# Patient Record
Sex: MALE | Race: WHITE | URBAN - METROPOLITAN AREA
[De-identification: names, ages, dates, MRNs, and addresses within clinical notes are randomized per-mention and may not be internally consistent; named-entity substitution may affect disease eponyms.]

---

## 2017-06-12 ENCOUNTER — OFFICE VISIT (OUTPATIENT)
Dept: INTERNAL MEDICINE CLINIC | Age: 47
End: 2017-06-12

## 2017-06-12 VITALS
SYSTOLIC BLOOD PRESSURE: 106 MMHG | BODY MASS INDEX: 35.3 KG/M2 | DIASTOLIC BLOOD PRESSURE: 61 MMHG | HEART RATE: 76 BPM | TEMPERATURE: 98.3 F | WEIGHT: 246.6 LBS | HEIGHT: 70 IN | OXYGEN SATURATION: 96 %

## 2017-06-12 DIAGNOSIS — E78.00 PURE HYPERCHOLESTEROLEMIA: Primary | ICD-10-CM

## 2017-06-12 DIAGNOSIS — R79.89 LFT ELEVATION: ICD-10-CM

## 2017-06-12 DIAGNOSIS — M25.511 CHRONIC RIGHT SHOULDER PAIN: ICD-10-CM

## 2017-06-12 DIAGNOSIS — G89.29 CHRONIC RIGHT SHOULDER PAIN: ICD-10-CM

## 2017-06-12 NOTE — PROGRESS NOTES
HISTORY OF PRESENT ILLNESS  Clementine Morales is a 49 y.o. male. HPI    F/u obesity hld LFt elevation  C/o right shoulder pain x 7-8 months with abduction. Wife is  and pt is performing home exercises--improving  Pain with external rotation of right shoulder  Lost 11 lbs with diet and exercise since last visit and feels much better  Still consume liquor 5 drinks or more once every 1-2 weeks      Patient Active Problem List    Diagnosis Date Noted    HLD (hyperlipidemia) 12/02/2016    LFT elevation 12/02/2016    Morbid obesity due to excess calories ([address] 12/02/2016     Current Outpatient Prescriptions   Medication Sig Dispense Refill    multivitamin (ONE A DAY) tablet Take 1 Tab by mouth daily.  calcium carbonate (TUMS) 200 mg calcium (500 mg) chew Take 1 Tab by mouth daily.  ibuprofen (ADVIL) 200 mg tablet Take  by mouth. No Known Allergies   Lab Results  Component Value Date/Time   Glucose 97 11/29/2016 10:42 AM   LDL, calculated 173 11/29/2016 10:42 AM   Creatinine 0.97 11/29/2016 10:42 AM      Lab Results  Component Value Date/Time   Cholesterol, total 262 11/29/2016 10:42 AM   HDL Cholesterol 43 11/29/2016 10:42 AM   LDL, calculated 173 11/29/2016 10:42 AM   Triglyceride 231 11/29/2016 10:42 AM     Lab Results  Component Value Date/Time   GFR est non-AA 93 11/29/2016 10:42 AM   GFR est  11/29/2016 10:42 AM   Creatinine 0.97 11/29/2016 10:42 AM   BUN 14 11/29/2016 10:42 AM   Sodium 141 11/29/2016 10:42 AM   Potassium 5.5 11/29/2016 10:42 AM   Chloride 98 11/29/2016 10:42 AM   CO2 25 11/29/2016 10:42 AM        ROS    Physical Exam   Constitutional: He appears well-developed and well-nourished. No distress. Appears stated age   HENT:   Head: Normocephalic. Cardiovascular: Normal rate, regular rhythm and normal heart sounds. Exam reveals no gallop and no friction rub. No murmur heard. Pulmonary/Chest: Effort normal and breath sounds normal.   Abdominal: Soft. Musculoskeletal: He exhibits no edema. Slight right anterior shoulder pain to palpation and pain with external rotation   Neurological: He is alert. Psychiatric: He has a normal mood and affect. Nursing note and vitals reviewed. ASSESSMENT and PLAN  Jennifer Jiang was seen today for obesity and cholesterol problem. Diagnoses and all orders for this visit:    Pure hypercholesterolemia  -     METABOLIC PANEL, COMPREHENSIVE  -     LIPID PANEL    Pt declines statin , will continue diet and exercise    LFT elevation  -     METABOLIC PANEL, COMPREHENSIVE   ? Fatty liver or etoh    Consider US if transaminases still elevated  Right shoulder pain   Declines ortho referral   Will continue home exercises  Follow-up Disposition:  Return in about 6 months (around 12/12/2017) for CPE.

## 2017-06-12 NOTE — MR AVS SNAPSHOT
Visit Information Date & Time Provider Department Dept. Phone Encounter #  
 6/12/2017 10:00 AM Aldo Velasquez, [address] [id number] Follow-up Instructions Return in about 6 months (around 12/12/2017) for CPE. Upcoming Health Maintenance Date Due Pneumococcal 19-64 Medium Risk (1 of 1 - PPSV23) 3/9/1988 INFLUENZA AGE 9 TO ADULT 8/1/2017 DTaP/Tdap/Td series (2 - Td) 11/29/2026 Allergies as of 6/12/2017  Review Complete On: 6/12/2017 By: Jack Guan LPN No Known Allergies Current Immunizations  Reviewed on 11/29/2016 Name Date Tdap 11/29/2016 Not reviewed this visit You Were Diagnosed With   
  
 Codes Comments Pure hypercholesterolemia    -  Primary ICD-10-CM: E78.00 ICD-9-CM: 272.0 LFT elevation     ICD-10-CM: R94.5 ICD-9-CM: 790.6 Vitals BP Pulse Temp Height(growth percentile) Weight(growth percentile) SpO2  
 106/61 (BP 1 Location: Left arm, BP Patient Position: Sitting) 76 98.3 °F (36.8 °C) (Oral) 5' 10\" (1.778 m) 246 lb 9.6 oz (111.9 kg) 96% BMI Smoking Status 35.38 kg/m2 Light Tobacco Smoker BMI and BSA Data Body Mass Index Body Surface Area  
 35.38 kg/m 2 2.35 m 2 Preferred Pharmacy Pharmacy Name Phone [facility]il[facility] Rosales [facility] [phone] Your Updated Medication List  
  
   
This list is accurate as of: 6/12/17 10:44 AM.  Always use your most recent med list. ADVIL 200 mg tablet Generic drug:  ibuprofen Take  by mouth.  
  
 calcium carbonate 200 mg calcium (500 mg) Chew Commonly known as:  TUMS Take 1 Tab by mouth daily. multivitamin tablet Commonly known as:  ONE A DAY Take 1 Tab by mouth daily. We Performed the Following LIPID PANEL [52073 CPT(R)] METABOLIC PANEL, COMPREHENSIVE [26152 CPT(R)] Follow-up Instructions Return in about 6 months (around 12/12/2017) for CPE. Introducing [facility] & HEALTH SERVICES! Hilda Gongora introduces [employer] patient portal. Now you can access parts of your medical record, email your doctor's office, and request medication refills online. 1. In your internet browser, go to [email or web address] 2. Click on the First Time User? Click Here link in the Sign In box. You will see the New Member Sign Up page. 3. Enter your [employer]et Access Code exactly as it appears below. You will not need to use this code after youve completed the sign-up process. If you do not sign up before the expiration date, you must request a new code. · Veset Access Code: [email or web address] Expires: 9/10/2017 10:44 AM 
 
4. Enter the last four digits of your Social Security Number (xxxx) and Date of Birth (mm/dd/yyyy) as indicated and click Submit. You will be taken to the next sign-up page. 5. Create a Veset ID. This will be your Veset login ID and cannot be changed, so think of one that is secure and easy to remember. 6. Create a Veset password. You can change your password at any time. 7. Enter your Password Reset Question and Answer. This can be used at a later time if you forget your password. 8. Enter your e-mail address. You will receive e-mail notification when new information is available in [address] 9. Click Sign Up. You can now view and download portions of your medical record. 10. Click the Download Summary menu link to download a portable copy of your medical information. If you have questions, please visit the Frequently Asked Questions section of the Veset website. Remember, [employer]et is NOT to be used for urgent needs. For medical emergencies, dial 911. Now available from your iPhone and Android! Please provide this summary of care documentation to your next provider. If you have any questions after today's visit, please call [phone].

## 2017-06-22 LAB
ALBUMIN SERPL-MCNC: 4.4 G/DL (ref 3.5–5.5)
ALBUMIN/GLOB SERPL: 2.1 {RATIO} (ref 1.2–2.2)
ALP SERPL-CCNC: 64 IU/L (ref 39–117)
ALT SERPL-CCNC: 47 IU/L (ref 0–44)
AST SERPL-CCNC: 54 IU/L (ref 0–40)
BILIRUB SERPL-MCNC: 0.7 MG/DL (ref 0–1.2)
BUN SERPL-MCNC: 15 MG/DL (ref 6–24)
BUN/CREAT SERPL: 15 (ref 9–20)
CALCIUM SERPL-MCNC: 9.3 MG/DL (ref 8.7–10.2)
CHLORIDE SERPL-SCNC: 103 MMOL/L (ref 96–106)
CHOLEST SERPL-MCNC: 199 MG/DL (ref 100–199)
CO2 SERPL-SCNC: 21 MMOL/L (ref 18–29)
CREAT SERPL-MCNC: 0.98 MG/DL (ref 0.76–1.27)
GLOBULIN SER CALC-MCNC: 2.1 G/DL (ref 1.5–4.5)
GLUCOSE SERPL-MCNC: 96 MG/DL (ref 65–99)
HDLC SERPL-MCNC: 39 MG/DL
LDLC SERPL CALC-MCNC: 136 MG/DL (ref 0–99)
POTASSIUM SERPL-SCNC: 4.5 MMOL/L (ref 3.5–5.2)
PROT SERPL-MCNC: 6.5 G/DL (ref 6–8.5)
SODIUM SERPL-SCNC: 141 MMOL/L (ref 134–144)
TRIGL SERPL-MCNC: 119 MG/DL (ref 0–149)
VLDLC SERPL CALC-MCNC: 24 MG/DL (ref 5–40)

## 2017-12-21 ENCOUNTER — OFFICE VISIT (OUTPATIENT)
Dept: INTERNAL MEDICINE CLINIC | Age: 47
End: 2017-12-21

## 2017-12-21 VITALS
WEIGHT: 262 LBS | HEART RATE: 68 BPM | SYSTOLIC BLOOD PRESSURE: 110 MMHG | HEIGHT: 70 IN | OXYGEN SATURATION: 96 % | BODY MASS INDEX: 37.51 KG/M2 | DIASTOLIC BLOOD PRESSURE: 68 MMHG | TEMPERATURE: 97.8 F

## 2017-12-21 DIAGNOSIS — Z00.00 ROUTINE GENERAL MEDICAL EXAMINATION AT A HEALTH CARE FACILITY: Primary | ICD-10-CM

## 2017-12-21 DIAGNOSIS — E78.00 PURE HYPERCHOLESTEROLEMIA: ICD-10-CM

## 2017-12-21 DIAGNOSIS — R79.89 LFT ELEVATION: ICD-10-CM

## 2017-12-21 DIAGNOSIS — E66.9 CLASS 2 OBESITY WITHOUT SERIOUS COMORBIDITY WITH BODY MASS INDEX (BMI) OF 35.0 TO 35.9 IN ADULT, UNSPECIFIED OBESITY TYPE: ICD-10-CM

## 2017-12-21 NOTE — MR AVS SNAPSHOT
Visit Information Date & Time Provider Department Dept. Phone Encounter #  
 12/21/2017 10:30 AM Yovana Palma, [address] [place] [address],[address] [phone] [id number] Follow-up Instructions Return in about 6 months (around 6/21/2018) for weight hld. Upcoming Health Maintenance Date Due DTaP/Tdap/Td series (2 - Td) 11/29/2026 Allergies as of 12/21/2017  Review Complete On: 12/21/2017 By: Mirella Houser LPN No Known Allergies Current Immunizations  Reviewed on 11/29/2016 Name Date Tdap 11/29/2016 Not reviewed this visit You Were Diagnosed With   
  
 Codes Comments Routine general medical examination at a health care facility    -  Primary ICD-10-CM: Z00.00 ICD-9-CM: V70.0 Pure hypercholesterolemia     ICD-10-CM: E78.00 ICD-9-CM: 272.0 LFT elevation     ICD-10-CM: R79.89 ICD-9-CM: 790.6 Class 2 obesity without serious comorbidity with body mass index (BMI) of 35.0 to 35.9 in adult, unspecified obesity type     ICD-10-CM: E66.9, Z68.35 ICD-9-CM: 278.00, V85.35 Vitals BP Pulse Temp Height(growth percentile) Weight(growth percentile) SpO2  
 110/68 (BP 1 Location: Left arm, BP Patient Position: Sitting) 68 97.8 °F (36.6 °C) (Oral) 5' 10\" (1.778 m) 262 lb (118.8 kg) 96% BMI Smoking Status 37.59 kg/m2 Light Tobacco Smoker BMI and BSA Data Body Mass Index Body Surface Area  
 37.59 kg/m 2 2.42 m 2 Preferred Pharmacy Pharmacy Name Phone Charley Martines 70 [phone] Your Updated Medication List  
  
   
This list is accurate as of: 12/21/17 11:04 AM.  Always use your most recent med list. ADVIL 200 mg tablet Generic drug:  ibuprofen Take  by mouth.  
  
 calcium carbonate 200 mg calcium (500 mg) Chew Commonly known as:  TUMS Take 1 Tab by mouth daily. multivitamin tablet Commonly known as:  ONE A DAY  
 Take 1 Tab by mouth daily. We Performed the Following CBC W/O DIFF [59909 CPT(R)] GGT [95209 CPT(R)] LIPID PANEL [02115 CPT(R)] METABOLIC PANEL, COMPREHENSIVE [51216 CPT(R)] TSH 3RD GENERATION [39458 CPT(R)] Follow-up Instructions Return in about 6 months (around 6/21/2018) for weight hld. To-Do List   
 12/29/2017 Imaging:  US ABD COMP Introducing [facility] & HEALTH SERVICES! [employer] introduces [employer] patient portal. Now you can access parts of your medical record, email your doctor's office, and request medication refills online. 1. In your internet browser, go to [email or web address] 2. Click on the First Time User? Click Here link in the Sign In box. You will see the New Member Sign Up page. 3. Enter your [employer] Access Code exactly as it appears below. You will not need to use this code after youve completed the sign-up process. If you do not sign up before the expiration date, you must request a new code. · [employer] Access Code: [email or web address] Expires: 3/21/2018 11:04 AM 
 
4. Enter the last four digits of your Social Security Number (xxxx) and Date of Birth (mm/dd/yyyy) as indicated and click Submit. You will be taken to the next sign-up page. 5. Create a [employer] ID. This will be your [employer] login ID and cannot be changed, so think of one that is secure and easy to remember. 6. Create a [employer] password. You can change your password at any time. 7. Enter your Password Reset Question and Answer. This can be used at a later time if you forget your password. 8. Enter your e-mail address. You will receive e-mail notification when new information is available in [address] 9. Click Sign Up. You can now view and download portions of your medical record. 10. Click the Download Summary menu link to download a portable copy of your medical information. If you have questions, please visit the Frequently Asked Questions section of the [employer] website. Remember, [employer] is NOT to be used for urgent needs. For medical emergencies, dial 911. Now available from your iPhone and Android! Please provide this summary of care documentation to your next provider. If you have any questions after today's visit, please call [phone].

## 2017-12-21 NOTE — PROGRESS NOTES
HISTORY OF PRESENT ILLNESS  Chucky Mckoy is a 49 y.o. male. HPI   Here for CPE F/u obesity hld LFt elevation  Weight back up --has been away from gym x 7 weeks  Not on strict diet  C/o right shoulder pain x 7-8 months with abduction. Wife is  and pt is performing home exercises--improving  Pain with external rotation of right shoulder  Still consume liquor 5 drinks or more once every 1-2 weeks  Neg hep b, c, jay and smooth muscle ab  Has not had US liver    Patient Active Problem List    Diagnosis Date Noted    HLD (hyperlipidemia) 12/02/2016    LFT elevation 12/02/2016    Morbid obesity due to excess calories ([address] 12/02/2016     Current Outpatient Prescriptions   Medication Sig Dispense Refill    multivitamin (ONE A DAY) tablet Take 1 Tab by mouth daily.  calcium carbonate (TUMS) 200 mg calcium (500 mg) chew Take 1 Tab by mouth daily.  ibuprofen (ADVIL) 200 mg tablet Take  by mouth. No Known Allergies   Lab Results  Component Value Date/Time   WBC 6.5 11/29/2016 10:42 AM   HGB 16.9 11/29/2016 10:42 AM   HCT 48.5 11/29/2016 10:42 AM   PLATELET 672 45/97/2100 10:42 AM   MCV 86 11/29/2016 10:42 AM     Lab Results  Component Value Date/Time   Glucose 96 06/21/2017 08:37 AM   LDL, calculated 136 06/21/2017 08:37 AM   Creatinine 0.98 06/21/2017 08:37 AM      Lab Results  Component Value Date/Time   Cholesterol, total 199 06/21/2017 08:37 AM   HDL Cholesterol 39 06/21/2017 08:37 AM   LDL, calculated 136 06/21/2017 08:37 AM   Triglyceride 119 06/21/2017 08:37 AM     Lab Results  Component Value Date/Time   ALT (SGPT) 47 06/21/2017 08:37 AM   AST (SGOT) 54 06/21/2017 08:37 AM   Alk. phosphatase 64 06/21/2017 08:37 AM   Bilirubin, total 0.7 06/21/2017 08:37 AM   Albumin 4.4 06/21/2017 08:37 AM   Protein, total 6.5 06/21/2017 08:37 AM   PLATELET 649 74/68/5300 10:42 AM          ROS    Physical Exam   Constitutional: He appears well-developed and well-nourished. No distress. Appears stated age, obese   HENT:   Head: Normocephalic. Mouth/Throat: Oropharynx is clear and moist.   Eyes: Pupils are equal, round, and reactive to light. Neck: Normal range of motion. Neck supple. No JVD present. No tracheal deviation present. No thyromegaly present. Cardiovascular: Normal rate, regular rhythm and normal heart sounds. Exam reveals no gallop and no friction rub. No murmur heard. Pulmonary/Chest: Effort normal and breath sounds normal. No respiratory distress. He has no wheezes. He has no rales. He exhibits no tenderness. Abdominal: Soft. He exhibits no distension and no mass. There is no tenderness. There is no rebound and no guarding. Musculoskeletal: He exhibits no edema or tenderness. Lymphadenopathy:     He has no cervical adenopathy. Neurological: He is alert. Skin: Skin is warm. Psychiatric: He has a normal mood and affect. Nursing note and vitals reviewed. ASSESSMENT and PLAN  Diagnoses and all orders for this visit:    1. Routine general medical examination at a health care facility  -     CBC W/O DIFF  -     METABOLIC PANEL, COMPREHENSIVE  -     LIPID PANEL  -     TSH 3RD GENERATION   Declines flu shot   Needs weight reduction with diet and exercise    2. Pure hypercholesterolemia   Not exercising, weight up   3. LFT elevation  -     GGT  -     US ABD COMP; Future    Limit alcohol consumption to < 2 drinks per pt    4. Class 2 obesity without serious comorbidity with body mass index (BMI) of 35.0 to 35.9 in adult, unspecified obesity type      Follow-up Disposition:  Return in about 6 months (around 6/21/2018) for weight hld.

## 2017-12-22 LAB
ALBUMIN SERPL-MCNC: 4.6 G/DL (ref 3.5–5.5)
ALBUMIN/GLOB SERPL: 2 {RATIO} (ref 1.2–2.2)
ALP SERPL-CCNC: 66 IU/L (ref 39–117)
ALT SERPL-CCNC: 39 IU/L (ref 0–44)
AST SERPL-CCNC: 38 IU/L (ref 0–40)
BILIRUB SERPL-MCNC: 0.7 MG/DL (ref 0–1.2)
BUN SERPL-MCNC: 14 MG/DL (ref 6–24)
BUN/CREAT SERPL: 15 (ref 9–20)
CALCIUM SERPL-MCNC: 9.7 MG/DL (ref 8.7–10.2)
CHLORIDE SERPL-SCNC: 101 MMOL/L (ref 96–106)
CHOLEST SERPL-MCNC: 230 MG/DL (ref 100–199)
CO2 SERPL-SCNC: 24 MMOL/L (ref 18–29)
CREAT SERPL-MCNC: 0.96 MG/DL (ref 0.76–1.27)
ERYTHROCYTE [DISTWIDTH] IN BLOOD BY AUTOMATED COUNT: 14 % (ref 12.3–15.4)
GGT SERPL-CCNC: 24 IU/L (ref 0–65)
GLOBULIN SER CALC-MCNC: 2.3 G/DL (ref 1.5–4.5)
GLUCOSE SERPL-MCNC: 91 MG/DL (ref 65–99)
HCT VFR BLD AUTO: 48.4 % (ref 37.5–51)
HDLC SERPL-MCNC: 44 MG/DL
HGB BLD-MCNC: 17.2 G/DL (ref 13–17.7)
LDLC SERPL CALC-MCNC: 145 MG/DL (ref 0–99)
MCH RBC QN AUTO: 30.6 PG (ref 26.6–33)
MCHC RBC AUTO-ENTMCNC: 35.5 G/DL (ref 31.5–35.7)
MCV RBC AUTO: 86 FL (ref 79–97)
PLATELET # BLD AUTO: 272 X10E3/UL (ref 150–379)
POTASSIUM SERPL-SCNC: 4.6 MMOL/L (ref 3.5–5.2)
PROT SERPL-MCNC: 6.9 G/DL (ref 6–8.5)
RBC # BLD AUTO: 5.63 X10E6/UL (ref 4.14–5.8)
SODIUM SERPL-SCNC: 141 MMOL/L (ref 134–144)
TRIGL SERPL-MCNC: 205 MG/DL (ref 0–149)
TSH SERPL DL<=0.005 MIU/L-ACNC: 1.66 UIU/ML (ref 0.45–4.5)
VLDLC SERPL CALC-MCNC: 41 MG/DL (ref 5–40)
WBC # BLD AUTO: 5.5 X10E3/UL (ref 3.4–10.8)

## 2018-06-10 NOTE — PROGRESS NOTES
HISTORY OF PRESENT ILLNESS  Yuko Rutherford is a 51 y.o. male. HPI       F/u obesity hld LFt elevation  Last LFt wnl  US ordered but not done  etoh--  Has been sedentary, weight unchanged  Last LDL--145      Last OV:  Here for CPEWeight back up --has been away from gym x 7 weeks  Not on strict diet  C/o right shoulder pain x 7-8 months with abduction. Wife is  and pt is performing home exercises--improving  Pain with external rotation of right shoulder  Still consume liquor 5 drinks or more once every 1-2 weeks  Neg hep b, c, jay and smooth muscle ab  Has not had US liver       Patient Active Problem List    Diagnosis Date Noted    HLD (hyperlipidemia) 12/02/2016    LFT elevation 12/02/2016    Morbid obesity due to excess calories ([address] 12/02/2016     Current Outpatient Prescriptions   Medication Sig Dispense Refill    multivitamin (ONE A DAY) tablet Take 1 Tab by mouth daily.  calcium carbonate (TUMS) 200 mg calcium (500 mg) chew Take 1 Tab by mouth daily.  ibuprofen (ADVIL) 200 mg tablet Take  by mouth. No Known Allergies   Lab Results  Component Value Date/Time   Glucose 91 12/21/2017 11:17 AM   LDL, calculated 145 (H) 12/21/2017 11:17 AM   Creatinine 0.96 12/21/2017 11:17 AM      Lab Results  Component Value Date/Time   Cholesterol, total 230 (H) 12/21/2017 11:17 AM   HDL Cholesterol 44 12/21/2017 11:17 AM   LDL, calculated 145 (H) 12/21/2017 11:17 AM   Triglyceride 205 (H) 12/21/2017 11:17 AM     Lab Results  Component Value Date/Time   GFR est non-AA 93 12/21/2017 11:17 AM   GFR est  12/21/2017 11:17 AM   Creatinine 0.96 12/21/2017 11:17 AM   BUN 14 12/21/2017 11:17 AM   Sodium 141 12/21/2017 11:17 AM   Potassium 4.6 12/21/2017 11:17 AM   Chloride 101 12/21/2017 11:17 AM   CO2 24 12/21/2017 11:17 AM        ROS    Physical Exam   Constitutional: He appears well-developed and well-nourished. No distress. Appears stated age, obese, nad   HENT:   Head: Normocephalic. Cardiovascular: Normal rate, regular rhythm and normal heart sounds. Exam reveals no gallop and no friction rub. No murmur heard. Pulmonary/Chest: Effort normal and breath sounds normal. No respiratory distress. He has no wheezes. He has no rales. He exhibits no tenderness. Abdominal: Soft. Musculoskeletal: He exhibits no edema. Neurological: He is alert. Psychiatric: He has a normal mood and affect. Nursing note and vitals reviewed. ASSESSMENT and PLAN  Diagnoses and all orders for this visit:    1. Morbid obesity due to excess calories ([address]   I have reviewed/discussed the above normal BMI with the patient. I have recommended the following interventions: dietary management education, guidance, and counseling and encourage exercise . Iliana Herrera 2. Pure hypercholesterolemia  -     LIPID PANEL  -     METABOLIC PANEL, COMPREHENSIVE   Pt wants to avoid cholesterol lowering medication  3. LFT elevation  -     METABOLIC PANEL, COMPREHENSIVE   Advised weight reduction, limit etoh   Consider US abdomen if LFT up--discussed    Follow-up Disposition:  Return in about 6 months (around 12/11/2018) for chol LFT weight.

## 2018-06-11 ENCOUNTER — OFFICE VISIT (OUTPATIENT)
Dept: INTERNAL MEDICINE CLINIC | Age: 48
End: 2018-06-11

## 2018-06-11 VITALS
OXYGEN SATURATION: 95 % | TEMPERATURE: 97.8 F | WEIGHT: 262 LBS | HEART RATE: 62 BPM | SYSTOLIC BLOOD PRESSURE: 125 MMHG | DIASTOLIC BLOOD PRESSURE: 78 MMHG | BODY MASS INDEX: 37.51 KG/M2 | HEIGHT: 70 IN

## 2018-06-11 DIAGNOSIS — E78.00 PURE HYPERCHOLESTEROLEMIA: ICD-10-CM

## 2018-06-11 DIAGNOSIS — R79.89 LFT ELEVATION: ICD-10-CM

## 2018-06-11 DIAGNOSIS — E66.01 MORBID OBESITY DUE TO EXCESS CALORIES (HCC): Primary | ICD-10-CM

## 2018-06-11 NOTE — MR AVS SNAPSHOT
[address] 
[phone] Patient: Jennifer Marvin MRN: [id number] Visit Information Date & Time Provider Department Dept. Phone Encounter #  
 2018  8:45 AM Geraldine Roca, [address] [place] [address],[address] [phone] [id number] Follow-up Instructions Return in about 6 months (around 2018) for chol LFT weight. Upcoming Health Maintenance Date Due Influenza Age 5 to Adult 2018 DTaP/Tdap/Td series (2 - Td) 2026 Allergies as of 2018  Review Complete On: 2018 By: Gigi Tai LPN No Known Allergies Current Immunizations  Reviewed on 2016 Name Date Tdap 2016 Not reviewed this visit You Were Diagnosed With   
  
 Codes Comments Morbid obesity due to excess calories ([facility]    -  Primary ICD-10-CM: E66.01 
ICD-9-CM: 278.01   
 Pure hypercholesterolemia     ICD-10-CM: E78.00 ICD-9-CM: 272.0 LFT elevation     ICD-10-CM: R79.89 ICD-9-CM: 790.6 Vitals BP Pulse Temp Height(growth percentile) Weight(growth percentile) SpO2  
 125/78 (BP 1 Location: Left arm, BP Patient Position: Sitting) 62 97.8 °F (36.6 °C) (Oral) 5' 10\" (1.778 m) 262 lb (118.8 kg) 95% BMI Smoking Status 37.59 kg/m2 Light Tobacco Smoker BMI and BSA Data Body Mass Index Body Surface Area  
 37.59 kg/m 2 2.42 m 2 Preferred Pharmacy Pharmacy Name Phone Kate [facility]mart [address] , [facility] 70 [phone] Your Updated Medication List  
  
   
This list is accurate as of 18  9:22 AM.  Always use your most recent med list. ADVIL 200 mg tablet Generic drug:  ibuprofen Take  by mouth.  
  
 calcium carbonate 200 mg calcium (500 mg) Chew Commonly known as:  TUMS Take 1 Tab by mouth daily. multivitamin tablet Commonly known as:  ONE A DAY Take 1 Tab by mouth daily. We Performed the Following LIPID PANEL [04927 CPT(R)] METABOLIC PANEL, COMPREHENSIVE [99832 CPT(R)] Follow-up Instructions Return in about 6 months (around 12/11/2018) for chol LFT weight. Introducing [facility] & HEALTH SERVICES! Darien Qureshi introduces [employer] patient portal. Now you can access parts of your medical record, email your doctor's office, and request medication refills online. 1. In your internet browser, go to [email or web address] 2. Click on the First Time User? Click Here link in the Sign In box. You will see the New Member Sign Up page. 3. Enter your [employer] Access Code exactly as it appears below. You will not need to use this code after youve completed the sign-up process. If you do not sign up before the expiration date, you must request a new code. · [employer] Access Code: [email or web address] Expires: 9/9/2018  9:22 AM 
 
4. Enter the last four digits of your Social Security Number (xxxx) and Date of Birth (mm/dd/yyyy) as indicated and click Submit. You will be taken to the next sign-up page. 5. Create a [employer] ID. This will be your [employer] login ID and cannot be changed, so think of one that is secure and easy to remember. 6. Create a [employer] password. You can change your password at any time. 7. Enter your Password Reset Question and Answer. This can be used at a later time if you forget your password. 8. Enter your e-mail address. You will receive e-mail notification when new information is available in [address] 9. Click Sign Up. You can now view and download portions of your medical record. 10. Click the Download Summary menu link to download a portable copy of your medical information. If you have questions, please visit the Frequently Asked Questions section of the [employer] website.  Remember, [employer] is NOT to be used for urgent needs. For medical emergencies, dial 911. Now available from your iPhone and Android! Please provide this summary of care documentation to your next provider. If you have any questions after today's visit, please call [phone].

## 2018-06-12 LAB
ALBUMIN SERPL-MCNC: 4.8 G/DL (ref 3.5–5.5)
ALBUMIN/GLOB SERPL: 2.5 {RATIO} (ref 1.2–2.2)
ALP SERPL-CCNC: 62 IU/L (ref 39–117)
ALT SERPL-CCNC: 46 IU/L (ref 0–44)
AST SERPL-CCNC: 44 IU/L (ref 0–40)
BILIRUB SERPL-MCNC: 0.7 MG/DL (ref 0–1.2)
BUN SERPL-MCNC: 12 MG/DL (ref 6–24)
BUN/CREAT SERPL: 14 (ref 9–20)
CALCIUM SERPL-MCNC: 9.5 MG/DL (ref 8.7–10.2)
CHLORIDE SERPL-SCNC: 104 MMOL/L (ref 96–106)
CHOLEST SERPL-MCNC: 238 MG/DL (ref 100–199)
CO2 SERPL-SCNC: 20 MMOL/L (ref 20–29)
CREAT SERPL-MCNC: 0.85 MG/DL (ref 0.76–1.27)
GFR SERPLBLD CREATININE-BSD FMLA CKD-EPI: 102 ML/MIN/1.73
GFR SERPLBLD CREATININE-BSD FMLA CKD-EPI: 118 ML/MIN/1.73
GLOBULIN SER CALC-MCNC: 1.9 G/DL (ref 1.5–4.5)
GLUCOSE SERPL-MCNC: 98 MG/DL (ref 65–99)
HDLC SERPL-MCNC: 44 MG/DL
LDLC SERPL CALC-MCNC: 160 MG/DL (ref 0–99)
POTASSIUM SERPL-SCNC: 4.7 MMOL/L (ref 3.5–5.2)
PROT SERPL-MCNC: 6.7 G/DL (ref 6–8.5)
SODIUM SERPL-SCNC: 142 MMOL/L (ref 134–144)
TRIGL SERPL-MCNC: 170 MG/DL (ref 0–149)
VLDLC SERPL CALC-MCNC: 34 MG/DL (ref 5–40)

## 2018-12-20 ENCOUNTER — OFFICE VISIT (OUTPATIENT)
Dept: INTERNAL MEDICINE CLINIC | Age: 48
End: 2018-12-20

## 2018-12-20 VITALS
HEART RATE: 69 BPM | TEMPERATURE: 97.4 F | WEIGHT: 260 LBS | BODY MASS INDEX: 37.22 KG/M2 | HEIGHT: 70 IN | OXYGEN SATURATION: 95 % | DIASTOLIC BLOOD PRESSURE: 75 MMHG | SYSTOLIC BLOOD PRESSURE: 124 MMHG

## 2018-12-20 DIAGNOSIS — E66.01 MORBID OBESITY (HCC): ICD-10-CM

## 2018-12-20 DIAGNOSIS — E78.00 PURE HYPERCHOLESTEROLEMIA: ICD-10-CM

## 2018-12-20 DIAGNOSIS — R79.89 LFT ELEVATION: Primary | ICD-10-CM

## 2018-12-20 NOTE — PROGRESS NOTES
Chief Complaint   Patient presents with    Cholesterol Problem     6 month follow up    Labs     6 month follow up

## 2018-12-20 NOTE — PROGRESS NOTES
HISTORY OF PRESENT ILLNESS  Anna Jose is a 51 y.o. male. HPI         F/u obesity hld LFT elevation  Last   etoh-4-6 /week              GGT wnl  US not done  Has not been exercising regularly but does some walking and running with -165  Feels well with exercise    last OV  Last LFt wnl  US ordered but not done  etoh--  Has been sedentary, weight unchanged  Last LDL--145        Last OV:  Here for CPEWeight back up --has been away from gym x 7 weeks  Not on strict diet  C/o right shoulder pain x 7-8 months with abduction. Wife is  and pt is performing home exercises--improving  Pain with external rotation of right shoulder  Still consume liquor 5 drinks or more once every 1-2 weeks  Neg hep b, c, jay and smooth muscle ab  Has not had US liver        Patient Active Problem List    Diagnosis Date Noted    HLD (hyperlipidemia) 12/02/2016    LFT elevation 12/02/2016    Morbid obesity due to excess calories ([address] 12/02/2016     Current Outpatient Medications   Medication Sig Dispense Refill    multivitamin (ONE A DAY) tablet Take 1 Tab by mouth daily.  calcium carbonate (TUMS) 200 mg calcium (500 mg) chew Take 1 Tab by mouth daily.  ibuprofen (ADVIL) 200 mg tablet Take  by mouth.        No Known Allergies   Lab Results   Component Value Date/Time    WBC 5.5 12/21/2017 11:17 AM    HGB 17.2 12/21/2017 11:17 AM    HCT 48.4 12/21/2017 11:17 AM    PLATELET 587 57/90/5256 11:17 AM    MCV 86 12/21/2017 11:17 AM     Lab Results   Component Value Date/Time    Glucose 98 06/11/2018 09:46 AM    LDL, calculated 160 (H) 06/11/2018 09:46 AM    Creatinine 0.85 06/11/2018 09:46 AM      Lab Results   Component Value Date/Time    Cholesterol, total 238 (H) 06/11/2018 09:46 AM    HDL Cholesterol 44 06/11/2018 09:46 AM    LDL, calculated 160 (H) 06/11/2018 09:46 AM    Triglyceride 170 (H) 06/11/2018 09:46 AM     Lab Results   Component Value Date/Time    GFR est non- 06/11/2018 09:46 AM GFR est  06/11/2018 09:46 AM    Creatinine 0.85 06/11/2018 09:46 AM    BUN 12 06/11/2018 09:46 AM    Sodium 142 06/11/2018 09:46 AM    Potassium 4.7 06/11/2018 09:46 AM    Chloride 104 06/11/2018 09:46 AM    CO2 20 06/11/2018 09:46 AM     No results found for: PSA, Sherine Caruso, PSAR3, [id number], [id number], PSALT     ROS    Physical Exam   Constitutional: He appears well-developed and well-nourished. No distress. Appears stated age, obese , nad   HENT:   Head: Normocephalic. Cardiovascular: Normal rate, regular rhythm and normal heart sounds. Exam reveals no gallop and no friction rub. No murmur heard. Pulmonary/Chest: Effort normal and breath sounds normal.   Abdominal: Soft. Musculoskeletal: He exhibits no edema. Neurological: He is alert. Psychiatric: He has a normal mood and affect. Nursing note and vitals reviewed. ASSESSMENT and PLAN  Diagnoses and all orders for this visit:    1. LFT elevation  -     METABOLIC PANEL, COMPREHENSIVE  -     REFERRAL TO GASTROENTEROLOGY   advised weight reduction-suspect fatty liver  2. Pure hypercholesterolemia  -     METABOLIC PANEL, COMPREHENSIVE  -     LIPID PANEL   Managing with diet and exercise  3. Morbid obesity ([address]   I have reviewed/discussed the above normal BMI with the patient. I have recommended the following interventions: dietary management education, guidance, and counseling and encourage exercise . [facility] Follow-up Disposition:  Return in about 6 months (around 6/20/2019) for lft colon screen weight.

## 2018-12-21 LAB
ALBUMIN SERPL-MCNC: 4.6 G/DL (ref 3.5–5.5)
ALBUMIN/GLOB SERPL: 2 {RATIO} (ref 1.2–2.2)
ALP SERPL-CCNC: 70 IU/L (ref 39–117)
ALT SERPL-CCNC: 50 IU/L (ref 0–44)
AST SERPL-CCNC: 43 IU/L (ref 0–40)
BILIRUB SERPL-MCNC: 0.6 MG/DL (ref 0–1.2)
BUN SERPL-MCNC: 15 MG/DL (ref 6–24)
BUN/CREAT SERPL: 14 (ref 9–20)
CALCIUM SERPL-MCNC: 9.6 MG/DL (ref 8.7–10.2)
CHLORIDE SERPL-SCNC: 103 MMOL/L (ref 96–106)
CHOLEST SERPL-MCNC: 229 MG/DL (ref 100–199)
CO2 SERPL-SCNC: 23 MMOL/L (ref 20–29)
CREAT SERPL-MCNC: 1.04 MG/DL (ref 0.76–1.27)
GLOBULIN SER CALC-MCNC: 2.3 G/DL (ref 1.5–4.5)
GLUCOSE SERPL-MCNC: 88 MG/DL (ref 65–99)
HDLC SERPL-MCNC: 39 MG/DL
LDLC SERPL CALC-MCNC: 158 MG/DL (ref 0–99)
POTASSIUM SERPL-SCNC: 5.2 MMOL/L (ref 3.5–5.2)
PROT SERPL-MCNC: 6.9 G/DL (ref 6–8.5)
SODIUM SERPL-SCNC: 142 MMOL/L (ref 134–144)
TRIGL SERPL-MCNC: 162 MG/DL (ref 0–149)
VLDLC SERPL CALC-MCNC: 32 MG/DL (ref 5–40)

## 2019-12-07 NOTE — PROGRESS NOTES
HISTORY OF PRESENT ILLNESS  Sara Banuelos is a 47 y.o. male. 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     Last OV  Last   etoh-4-6 /week              GGT wnl  US not done  Has not been exercising regularly but does some walking and running with -165  Feels well with exercise     last OV  Last LFt wnl  US ordered but not done  etoh--  Has been sedentary, weight unchanged  Last LDL--145          Patient Active Problem List    Diagnosis Date Noted    HLD (hyperlipidemia) 12/02/2016    LFT elevation 12/02/2016    Morbid obesity due to excess calories ([address] 12/02/2016     Current Outpatient Medications   Medication Sig Dispense Refill    multivitamin (ONE A DAY) tablet Take 1 Tab by mouth daily.  calcium carbonate (TUMS) 200 mg calcium (500 mg) chew Take 1 Tab by mouth daily.  ibuprofen (ADVIL) 200 mg tablet Take  by mouth. No Known Allergies  Social History     Tobacco Use    Smoking status: Light Tobacco Smoker     Types: Cigars     Start date: 1/1/2000    Smokeless tobacco: Never Used   Substance Use Topics    Alcohol use:  Yes     Alcohol/week: 6.0 - 8.0 standard drinks     Types: 6 - 8 Shots of liquor per week     Frequency: 2-3 times a week     Drinks per session: 1 or 2      Lab Results   Component Value Date/Time    WBC 5.5 12/21/2017 11:17 AM    HGB 17.2 12/21/2017 11:17 AM    HCT 48.4 12/21/2017 11:17 AM    PLATELET 159 73/59/9431 11:17 AM    MCV 86 12/21/2017 11:17 AM     Lab Results   Component Value Date/Time    Glucose 88 12/20/2018 08:46 AM    LDL, calculated 158 (H) 12/20/2018 08:46 AM    Creatinine 1.04 12/20/2018 08:46 AM      Lab Results   Component Value Date/Time    Cholesterol, total 229 (H) 12/20/2018 08:46 AM    HDL Cholesterol 39 (L) 12/20/2018 08:46 AM LDL, calculated 158 (H) 12/20/2018 08:46 AM    Triglyceride 162 (H) 12/20/2018 08:46 AM     Lab Results   Component Value Date/Time    GFR est non-AA 84 12/20/2018 08:46 AM    GFR est AA 97 12/20/2018 08:46 AM    Creatinine 1.04 12/20/2018 08:46 AM    BUN 15 12/20/2018 08:46 AM    Sodium 142 12/20/2018 08:46 AM    Potassium 5.2 12/20/2018 08:46 AM    Chloride 103 12/20/2018 08:46 AM    CO2 23 12/20/2018 08:46 AM        ROS    Physical Exam  Vitals signs and nursing note reviewed. Constitutional:       General: He is not in acute distress. Appearance: He is well-developed. He is obese. Comments: Appears stated age   HENT:      Head: Normocephalic. Cardiovascular:      Rate and Rhythm: Normal rate and regular rhythm. Heart sounds: Normal heart sounds. Pulmonary:      Effort: Pulmonary effort is normal.      Breath sounds: Normal breath sounds. Abdominal:      Palpations: Abdomen is soft. Neurological:      Mental Status: He is alert. ASSESSMENT and PLAN  Diagnoses and all orders for this visit:    1. LFT elevation  -     REFERRAL TO GASTROENTEROLOGY  -     METABOLIC PANEL, COMPREHENSIVE   Pt declines US and prefers to monitor labs with weight control-possible fatty liver     2. Colon cancer screening  -     REFERRAL TO GASTROENTEROLOGY    3. Prostate cancer screening  -     PSA W/ REFLX FREE PSA    4. Pure hypercholesterolemia  -     CBC W/O DIFF  -     METABOLIC PANEL, COMPREHENSIVE  -     LIPID PANEL  -     TSH 3RD GENERATION   Managing with diet and exercise    5. Obesity (BMI 30-39. 9)   Pt will continue low carb diet and exercizsxe    Follow-up and Dispositions    · Return in about 1 year (around 12/9/2020) for CPE.

## 2019-12-09 ENCOUNTER — OFFICE VISIT (OUTPATIENT)
Dept: INTERNAL MEDICINE CLINIC | Age: 49
End: 2019-12-09

## 2019-12-09 VITALS
HEIGHT: 70 IN | TEMPERATURE: 97.4 F | OXYGEN SATURATION: 96 % | BODY MASS INDEX: 35.36 KG/M2 | WEIGHT: 247 LBS | RESPIRATION RATE: 16 BRPM | HEART RATE: 65 BPM | DIASTOLIC BLOOD PRESSURE: 72 MMHG | SYSTOLIC BLOOD PRESSURE: 114 MMHG

## 2019-12-09 DIAGNOSIS — Z12.5 PROSTATE CANCER SCREENING: ICD-10-CM

## 2019-12-09 DIAGNOSIS — R79.89 LFT ELEVATION: Primary | ICD-10-CM

## 2019-12-09 DIAGNOSIS — E78.00 PURE HYPERCHOLESTEROLEMIA: ICD-10-CM

## 2019-12-09 DIAGNOSIS — Z12.11 COLON CANCER SCREENING: ICD-10-CM

## 2019-12-09 DIAGNOSIS — E66.9 OBESITY (BMI 30-39.9): ICD-10-CM

## 2019-12-10 LAB
ALBUMIN SERPL-MCNC: 4.6 G/DL (ref 3.5–5.5)
ALBUMIN/GLOB SERPL: 2.2 {RATIO} (ref 1.2–2.2)
ALP SERPL-CCNC: 69 IU/L (ref 39–117)
ALT SERPL-CCNC: 49 IU/L (ref 0–44)
AST SERPL-CCNC: 48 IU/L (ref 0–40)
BILIRUB SERPL-MCNC: 0.4 MG/DL (ref 0–1.2)
BUN SERPL-MCNC: 17 MG/DL (ref 6–24)
BUN/CREAT SERPL: 18 (ref 9–20)
CALCIUM SERPL-MCNC: 9.4 MG/DL (ref 8.7–10.2)
CHLORIDE SERPL-SCNC: 105 MMOL/L (ref 96–106)
CHOLEST SERPL-MCNC: 220 MG/DL (ref 100–199)
CO2 SERPL-SCNC: 21 MMOL/L (ref 20–29)
CREAT SERPL-MCNC: 0.94 MG/DL (ref 0.76–1.27)
ERYTHROCYTE [DISTWIDTH] IN BLOOD BY AUTOMATED COUNT: 13.6 % (ref 12.3–15.4)
GLOBULIN SER CALC-MCNC: 2.1 G/DL (ref 1.5–4.5)
GLUCOSE SERPL-MCNC: 108 MG/DL (ref 65–99)
HCT VFR BLD AUTO: 48.9 % (ref 37.5–51)
HDLC SERPL-MCNC: 45 MG/DL
HGB BLD-MCNC: 16.5 G/DL (ref 13–17.7)
LDLC SERPL CALC-MCNC: 157 MG/DL (ref 0–99)
MCH RBC QN AUTO: 29.2 PG (ref 26.6–33)
MCHC RBC AUTO-ENTMCNC: 33.7 G/DL (ref 31.5–35.7)
MCV RBC AUTO: 87 FL (ref 79–97)
PLATELET # BLD AUTO: 276 X10E3/UL (ref 150–450)
POTASSIUM SERPL-SCNC: 5 MMOL/L (ref 3.5–5.2)
PROT SERPL-MCNC: 6.7 G/DL (ref 6–8.5)
PSA SERPL-MCNC: 0.2 NG/ML (ref 0–4)
RBC # BLD AUTO: 5.65 X10E6/UL (ref 4.14–5.8)
REFLEX CRITERIA: NORMAL
SODIUM SERPL-SCNC: 142 MMOL/L (ref 134–144)
TRIGL SERPL-MCNC: 88 MG/DL (ref 0–149)
TSH SERPL DL<=0.005 MIU/L-ACNC: 1.69 UIU/ML (ref 0.45–4.5)
VLDLC SERPL CALC-MCNC: 18 MG/DL (ref 5–40)
WBC # BLD AUTO: 5.1 X10E3/UL (ref 3.4–10.8)

## 2019-12-12 LAB
HBA1C MFR BLD: 5.4 % (ref 4.8–5.6)
SPECIMEN STATUS REPORT, ROLRST: NORMAL

## 2020-06-08 NOTE — PROGRESS NOTES
HISTORY OF PRESENT ILLNESS  Joan Keenan is a 45 y.o. male. HPI   Joan Keenan is a 45 y.o. male being evaluated by a Virtual Visit (video visit) encounter to address concerns as mentioned above. A caregiver was present when appropriate. Due to this being a TeleHealth encounter (During OROEB-22 public health emergency), evaluation of the following organ systems was limited: Vitals/Constitutional/EENT/Resp/CV/GI//MS/Neuro/Skin/Heme-Lymph-Imm. Pursuant to the emergency declaration under the [facility], [address] [place] [address] authority and the [employer] and [employer] Act, this Virtual Visit was conducted with patient's (and/or legal guardian's) consent, to reduce the risk of exposure to COVID-19 and provide necessary medical care. Services were provided through a video synchronous discussion virtually to substitute for in-person encounter. --Sandor Reece MD on 6/8/2020 at 3:33 PM    An electronic signature was used to authenticate this note.   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  calcium carbonate (TUMS) 200 mg calcium (500 mg) chew Take 1 Tab by mouth daily.  ibuprofen (ADVIL) 200 mg tablet Take  by mouth. No Known Allergies  Social History     Tobacco Use    Smoking status: Light Tobacco Smoker     Types: Cigars     Start date: 1/1/2000    Smokeless tobacco: Never Used   Substance Use Topics    Alcohol use: Yes     Alcohol/week: 6.0 - 8.0 standard drinks     Types: 6 - 8 Shots of liquor per week     Frequency: 2-3 times a week     Drinks per session: 1 or 2      Lab Results   Component Value Date/Time    WBC 5.1 12/09/2019 08:49 AM    HGB 16.5 12/09/2019 08:49 AM    HCT 48.9 12/09/2019 08:49 AM    PLATELET 833 38/94/6914 08:49 AM    MCV 87 12/09/2019 08:49 AM     Lab Results   Component Value Date/Time    Hemoglobin A1c 5.4 12/09/2019 08:49 AM    Glucose 108 (H) 12/09/2019 08:49 AM    LDL, calculated 157 (H) 12/09/2019 08:49 AM    Creatinine 0.94 12/09/2019 08:49 AM      Lab Results   Component Value Date/Time    Cholesterol, total 220 (H) 12/09/2019 08:49 AM    HDL Cholesterol 45 12/09/2019 08:49 AM    LDL, calculated 157 (H) 12/09/2019 08:49 AM    Triglyceride 88 12/09/2019 08:49 AM     Lab Results   Component Value Date/Time    GFR est non-AA 94 12/09/2019 08:49 AM    GFR est  12/09/2019 08:49 AM    Creatinine 0.94 12/09/2019 08:49 AM    BUN 17 12/09/2019 08:49 AM    Sodium 142 12/09/2019 08:49 AM    Potassium 5.0 12/09/2019 08:49 AM    Chloride 105 12/09/2019 08:49 AM    CO2 21 12/09/2019 08:49 AM        ROS    Physical Exam  Constitutional:       Appearance: Normal appearance. He is obese. Pulmonary:      Effort: Pulmonary effort is normal.   Neurological:      Mental Status: He is alert. ASSESSMENT and PLAN  Diagnoses and all orders for this visit:    1. LFT elevation   Advised weight reduction and etoh cessation or limitation  Advised to see GI MD for LFT elevation and screening colonoscopy  2. Obesity (BMI 30.0-34.9)   I have reviewed/discussed the above normal BMI with the patient.   I have recommended the following interventions: dietary management education, guidance, and counseling and encourage exercise . Whitley Hobsonore         rtc 6 months CPE

## 2020-06-09 ENCOUNTER — VIRTUAL VISIT (OUTPATIENT)
Dept: INTERNAL MEDICINE CLINIC | Age: 50
End: 2020-06-09

## 2020-06-09 DIAGNOSIS — E66.9 OBESITY (BMI 30.0-34.9): ICD-10-CM

## 2020-06-09 DIAGNOSIS — R79.89 LFT ELEVATION: Primary | ICD-10-CM

## 2020-06-09 NOTE — PATIENT INSTRUCTIONS
This is an established visit conducted via telemedicine. The patient has been instructed that this meets HIPAA criteria and acknowledges and agrees to this method of visitation. Blane Rueda [place] 
68/22/88 
7:87 AM 
 
Chief Complaint Patient presents with  Complete Physical  
  Routine

## 2023-05-24 RX ORDER — IBUPROFEN 200 MG
TABLET ORAL
COMMUNITY

## 2023-05-24 RX ORDER — UREA 10 %
1 LOTION (ML) TOPICAL DAILY
COMMUNITY

## 2025-02-10 ASSESSMENT — ENCOUNTER SYMPTOMS
ALLERGIC/IMMUNOLOGIC NEGATIVE: 1
EYES NEGATIVE: 1
RESPIRATORY NEGATIVE: 1
GASTROINTESTINAL NEGATIVE: 1

## 2025-02-10 NOTE — PROGRESS NOTES
HISTORY OF PRESENT ILLNESS   Fernando Jimenez   is a 54 y.o.  male.  F/u obesity hld LFT elevation presumed fatty liver and CPE    Weight-down almost 40 lbs with diet and exercise  Cut back on etoh-very occassional now    Walks 10k steps per day on average  Goes to gym 6 d per week  No cp sob  Some mild joint soreness--right hip and knee  C/o ED issues and wants to try viagra  Etoh-rare now    Had screening colonoscopy last year--tics,no polyps-repeat in 10 years    Nonsmoker  M 2 daughters-  Works at [employer] teacher  Last OV  Was referred to GI MD last OV for ast/alt elevation and screening colonoscopy  Weight--about 247 lbs -stable  etoh 2 times per week-5-6 drinks per episode  Feels well overall     Patient Active Problem List    Diagnosis Date Noted    HLD (hyperlipidemia) 12/02/2016    Morbid obesity due to excess calories 12/02/2016    LFT elevation 12/02/2016     Current Outpatient Medications   Medication Sig Dispense Refill    calcium carbonate (OS-TRISHA) 1250 (500 Ca) MG chewable tablet Take 1 tablet by mouth daily      ibuprofen (ADVIL;MOTRIN) 200 MG tablet Take by mouth       No current facility-administered medications for this visit.     Not on File  Social History     Tobacco Use    Smoking status: Light Smoker     Types: Cigarettes     Start date: 1/1/2000    Smokeless tobacco: Never   Substance Use Topics    Alcohol use: Yes     Alcohol/week: 6.0 - 8.0 standard drinks of alcohol        BMP:   Lab Results   Component Value Date/Time     12/09/2019 08:49 AM    K 5.0 12/09/2019 08:49 AM     12/09/2019 08:49 AM    CO2 21 12/09/2019 08:49 AM    BUN 17 12/09/2019 08:49 AM    CREATININE 0.94 12/09/2019 08:49 AM    GLUCOSE 108 12/09/2019 08:49 AM    CALCIUM 9.4 12/09/2019 08:49 AM      CBC:   Lab Results   Component Value Date/Time    WBC 5.1 12/09/2019 08:49 AM    RBC 5.65 12/09/2019 08:49 AM    HGB 16.5 12/09/2019 08:49 AM    HCT 48.9 12/09/2019 08:49 AM    MCV 87 12/09/2019 08:49 AM    MCH 29.2

## 2025-02-12 ENCOUNTER — OFFICE VISIT (OUTPATIENT)
Age: 55
End: 2025-02-12

## 2025-02-12 VITALS
BODY MASS INDEX: 31.78 KG/M2 | OXYGEN SATURATION: 98 % | HEART RATE: 64 BPM | TEMPERATURE: 97.5 F | DIASTOLIC BLOOD PRESSURE: 84 MMHG | WEIGHT: 222 LBS | RESPIRATION RATE: 16 BRPM | SYSTOLIC BLOOD PRESSURE: 110 MMHG | HEIGHT: 70 IN

## 2025-02-12 DIAGNOSIS — R79.89 LFT ELEVATION: ICD-10-CM

## 2025-02-12 DIAGNOSIS — E66.9 OBESITY (BMI 30-39.9): ICD-10-CM

## 2025-02-12 DIAGNOSIS — Z00.00 ROUTINE PHYSICAL EXAMINATION: Primary | ICD-10-CM

## 2025-02-12 DIAGNOSIS — Z23 ENCOUNTER FOR IMMUNIZATION: ICD-10-CM

## 2025-02-12 LAB
ALBUMIN SERPL-MCNC: 4 G/DL (ref 3.5–5)
ALBUMIN/GLOB SERPL: 1.2 (ref 1.1–2.2)
ALP SERPL-CCNC: 66 U/L (ref 45–117)
ALT SERPL-CCNC: 74 U/L (ref 12–78)
ANION GAP SERPL CALC-SCNC: 8 MMOL/L (ref 2–12)
AST SERPL-CCNC: 53 U/L (ref 15–37)
BASOPHILS # BLD: 0.06 K/UL (ref 0–0.1)
BASOPHILS NFR BLD: 1 % (ref 0–1)
BILIRUB SERPL-MCNC: 0.6 MG/DL (ref 0.2–1)
BUN SERPL-MCNC: 19 MG/DL (ref 6–20)
BUN/CREAT SERPL: 21 (ref 12–20)
CALCIUM SERPL-MCNC: 9.9 MG/DL (ref 8.5–10.1)
CHLORIDE SERPL-SCNC: 105 MMOL/L (ref 97–108)
CHOLEST SERPL-MCNC: 212 MG/DL
CO2 SERPL-SCNC: 25 MMOL/L (ref 21–32)
CREAT SERPL-MCNC: 0.92 MG/DL (ref 0.7–1.3)
DIFFERENTIAL METHOD BLD: NORMAL
EOSINOPHIL # BLD: 0.18 K/UL (ref 0–0.4)
EOSINOPHIL NFR BLD: 3 % (ref 0–7)
ERYTHROCYTE [DISTWIDTH] IN BLOOD BY AUTOMATED COUNT: 12.5 % (ref 11.5–14.5)
EST. AVERAGE GLUCOSE BLD GHB EST-MCNC: 108 MG/DL
GLOBULIN SER CALC-MCNC: 3.3 G/DL (ref 2–4)
GLUCOSE SERPL-MCNC: 107 MG/DL (ref 65–100)
HBA1C MFR BLD: 5.4 % (ref 4–5.6)
HCT VFR BLD AUTO: 47.7 % (ref 36.6–50.3)
HDLC SERPL-MCNC: 52 MG/DL
HDLC SERPL: 4.1 (ref 0–5)
HGB BLD-MCNC: 16 G/DL (ref 12.1–17)
IMM GRANULOCYTES # BLD AUTO: 0.02 K/UL (ref 0–0.04)
IMM GRANULOCYTES NFR BLD AUTO: 0.3 % (ref 0–0.5)
LDLC SERPL CALC-MCNC: 141.8 MG/DL (ref 0–100)
LYMPHOCYTES # BLD: 1.66 K/UL (ref 0.8–3.5)
LYMPHOCYTES NFR BLD: 27.8 % (ref 12–49)
MCH RBC QN AUTO: 29.3 PG (ref 26–34)
MCHC RBC AUTO-ENTMCNC: 33.5 G/DL (ref 30–36.5)
MCV RBC AUTO: 87.4 FL (ref 80–99)
MONOCYTES # BLD: 0.7 K/UL (ref 0–1)
MONOCYTES NFR BLD: 11.7 % (ref 5–13)
NEUTS SEG # BLD: 3.35 K/UL (ref 1.8–8)
NEUTS SEG NFR BLD: 56.2 % (ref 32–75)
NRBC # BLD: 0 K/UL (ref 0–0.01)
NRBC BLD-RTO: 0 PER 100 WBC
PLATELET # BLD AUTO: 297 K/UL (ref 150–400)
PMV BLD AUTO: 10.8 FL (ref 8.9–12.9)
POTASSIUM SERPL-SCNC: 4.7 MMOL/L (ref 3.5–5.1)
PROT SERPL-MCNC: 7.3 G/DL (ref 6.4–8.2)
RBC # BLD AUTO: 5.46 M/UL (ref 4.1–5.7)
SODIUM SERPL-SCNC: 138 MMOL/L (ref 136–145)
TRIGL SERPL-MCNC: 91 MG/DL
TSH SERPL DL<=0.05 MIU/L-ACNC: 1.21 UIU/ML (ref 0.36–3.74)
VLDLC SERPL CALC-MCNC: 18.2 MG/DL
WBC # BLD AUTO: 6 K/UL (ref 4.1–11.1)

## 2025-02-12 RX ORDER — MULTIVITAMIN
1 TABLET ORAL DAILY
COMMUNITY

## 2025-02-12 RX ORDER — SILDENAFIL 50 MG/1
50 TABLET, FILM COATED ORAL DAILY PRN
Qty: 10 TABLET | Refills: 0 | Status: SHIPPED | OUTPATIENT
Start: 2025-02-12

## 2025-02-12 SDOH — ECONOMIC STABILITY: FOOD INSECURITY: WITHIN THE PAST 12 MONTHS, YOU WORRIED THAT YOUR FOOD WOULD RUN OUT BEFORE YOU GOT MONEY TO BUY MORE.: NEVER TRUE

## 2025-02-12 SDOH — ECONOMIC STABILITY: FOOD INSECURITY: WITHIN THE PAST 12 MONTHS, THE FOOD YOU BOUGHT JUST DIDN'T LAST AND YOU DIDN'T HAVE MONEY TO GET MORE.: NEVER TRUE

## 2025-02-12 ASSESSMENT — PATIENT HEALTH QUESTIONNAIRE - PHQ9
2. FEELING DOWN, DEPRESSED OR HOPELESS: NOT AT ALL
SUM OF ALL RESPONSES TO PHQ QUESTIONS 1-9: 0
SUM OF ALL RESPONSES TO PHQ QUESTIONS 1-9: 0
1. LITTLE INTEREST OR PLEASURE IN DOING THINGS: NOT AT ALL
SUM OF ALL RESPONSES TO PHQ9 QUESTIONS 1 & 2: 0
SUM OF ALL RESPONSES TO PHQ QUESTIONS 1-9: 0
SUM OF ALL RESPONSES TO PHQ QUESTIONS 1-9: 0

## 2025-02-13 LAB
PSA SERPL-MCNC: 0.2 NG/ML (ref 0–4)
REFLEX CRITERIA: NORMAL